# Patient Record
Sex: FEMALE | Race: WHITE | NOT HISPANIC OR LATINO
[De-identification: names, ages, dates, MRNs, and addresses within clinical notes are randomized per-mention and may not be internally consistent; named-entity substitution may affect disease eponyms.]

---

## 2017-12-18 PROBLEM — Z00.00 ENCOUNTER FOR PREVENTIVE HEALTH EXAMINATION: Status: ACTIVE | Noted: 2017-12-18

## 2018-01-16 ENCOUNTER — APPOINTMENT (OUTPATIENT)
Dept: OBGYN | Facility: CLINIC | Age: 28
End: 2018-01-16
Payer: COMMERCIAL

## 2018-01-16 VITALS
BODY MASS INDEX: 30.99 KG/M2 | SYSTOLIC BLOOD PRESSURE: 110 MMHG | WEIGHT: 168.4 LBS | DIASTOLIC BLOOD PRESSURE: 76 MMHG | HEIGHT: 62 IN

## 2018-01-16 DIAGNOSIS — N92.6 IRREGULAR MENSTRUATION, UNSPECIFIED: ICD-10-CM

## 2018-01-16 DIAGNOSIS — N94.6 DYSMENORRHEA, UNSPECIFIED: ICD-10-CM

## 2018-01-16 DIAGNOSIS — F15.90 OTHER STIMULANT USE, UNSPECIFIED, UNCOMPLICATED: ICD-10-CM

## 2018-01-16 DIAGNOSIS — Z82.49 FAMILY HISTORY OF ISCHEMIC HEART DISEASE AND OTHER DISEASES OF THE CIRCULATORY SYSTEM: ICD-10-CM

## 2018-01-16 DIAGNOSIS — F32.81 PREMENSTRUAL DYSPHORIC DISORDER: ICD-10-CM

## 2018-01-16 DIAGNOSIS — Z83.3 FAMILY HISTORY OF DIABETES MELLITUS: ICD-10-CM

## 2018-01-16 DIAGNOSIS — Z86.59 PERSONAL HISTORY OF OTHER MENTAL AND BEHAVIORAL DISORDERS: ICD-10-CM

## 2018-01-16 DIAGNOSIS — F19.90 OTHER PSYCHOACTIVE SUBSTANCE USE, UNSPECIFIED, UNCOMPLICATED: ICD-10-CM

## 2018-01-16 DIAGNOSIS — Z78.9 OTHER SPECIFIED HEALTH STATUS: ICD-10-CM

## 2018-01-16 DIAGNOSIS — Z13.79 ENCOUNTER FOR OTHER SCREENING FOR GENETIC AND CHROMOSOMAL ANOMALIES: ICD-10-CM

## 2018-01-16 DIAGNOSIS — N39.0 URINARY TRACT INFECTION, SITE NOT SPECIFIED: ICD-10-CM

## 2018-01-16 LAB
HCG UR QL: NEGATIVE
QUALITY CONTROL: YES

## 2018-01-16 PROCEDURE — 99385 PREV VISIT NEW AGE 18-39: CPT

## 2018-01-16 PROCEDURE — 81025 URINE PREGNANCY TEST: CPT

## 2018-01-16 RX ORDER — DULOXETINE HYDROCHLORIDE 60 MG/1
60 CAPSULE, DELAYED RELEASE ORAL
Refills: 0 | Status: ACTIVE | COMMUNITY

## 2018-01-19 LAB
APPEARANCE: ABNORMAL
BACTERIA UR CULT: NORMAL
BACTERIA: NEGATIVE
BILIRUBIN URINE: NEGATIVE
BLOOD URINE: NEGATIVE
C TRACH RRNA SPEC QL NAA+PROBE: NOT DETECTED
COLOR: YELLOW
GLUCOSE QUALITATIVE U: NEGATIVE MG/DL
HYALINE CASTS: 2 /LPF
KETONES URINE: NEGATIVE
LEUKOCYTE ESTERASE URINE: NEGATIVE
MICROSCOPIC-UA: NORMAL
N GONORRHOEA RRNA SPEC QL NAA+PROBE: NOT DETECTED
NITRITE URINE: NEGATIVE
PH URINE: 5.5
PROTEIN URINE: NEGATIVE MG/DL
RED BLOOD CELLS URINE: 2 /HPF
SOURCE TP AMPLIFICATION: NORMAL
SPECIFIC GRAVITY URINE: 1.02
SQUAMOUS EPITHELIAL CELLS: 1 /HPF
UROBILINOGEN URINE: NEGATIVE MG/DL
WHITE BLOOD CELLS URINE: 1 /HPF

## 2018-01-26 LAB
CYTOLOGY CVX/VAG DOC THIN PREP: NORMAL
HPV HIGH+LOW RISK DNA PNL CVX: NOT DETECTED

## 2019-01-29 ENCOUNTER — OTHER (OUTPATIENT)
Age: 29
End: 2019-01-29

## 2019-01-29 ENCOUNTER — APPOINTMENT (OUTPATIENT)
Dept: OBGYN | Facility: CLINIC | Age: 29
End: 2019-01-29
Payer: COMMERCIAL

## 2019-01-29 VITALS
SYSTOLIC BLOOD PRESSURE: 100 MMHG | BODY MASS INDEX: 29.3 KG/M2 | DIASTOLIC BLOOD PRESSURE: 70 MMHG | HEIGHT: 62 IN | WEIGHT: 159.2 LBS

## 2019-01-29 DIAGNOSIS — N88.2 STRICTURE AND STENOSIS OF CERVIX UTERI: ICD-10-CM

## 2019-01-29 DIAGNOSIS — R10.2 PELVIC AND PERINEAL PAIN: ICD-10-CM

## 2019-01-29 LAB
HCG UR QL: NEGATIVE
QUALITY CONTROL: YES

## 2019-01-29 PROCEDURE — 99395 PREV VISIT EST AGE 18-39: CPT

## 2019-01-29 PROCEDURE — 81025 URINE PREGNANCY TEST: CPT

## 2019-01-29 NOTE — PHYSICAL EXAM
[Awake] : awake [Alert] : alert [Soft] : soft [Oriented x3] : oriented to person, place, and time [Normal] : uterus [Labia Majora] : labia major [Labia Minora] : labia minora [No Bleeding] : there was no active vaginal bleeding [IUD String] : had an IUD string protruding out [Normal Position] : in a normal position [Uterine Adnexae] : were not tender and not enlarged [Acute Distress] : no acute distress [LAD] : no lymphadenopathy [Thyroid Nodule] : no thyroid nodule [Goiter] : no goiter [Mass] : no breast mass [Nipple Discharge] : no nipple discharge [Axillary LAD] : no axillary lymphadenopathy [Tender] : non tender [Distended] : not distended [H/Smegaly] : no hepatosplenomegaly [Depressed Mood] : not depressed [Flat Affect] : affect not flat [Tenderness] : nontender [Enlarged ___ wks] : not enlarged [Mass ___ cm] : no uterine mass was palpated [Adnexa Tenderness] : were not tender [Ovarian Mass (___ Cm)] : there were no adnexal masses [de-identified] : breast exam: supine and upright     pendulous breasts [FreeTextEntry5] : no pelvic or uerine pain on exam

## 2019-02-05 LAB
APPEARANCE: CLEAR
BACTERIA UR CULT: NORMAL
BACTERIA: NEGATIVE
BILIRUBIN URINE: NEGATIVE
BLOOD URINE: NEGATIVE
C TRACH RRNA SPEC QL NAA+PROBE: NOT DETECTED
COLOR: YELLOW
CYTOLOGY CVX/VAG DOC THIN PREP: NORMAL
GLUCOSE QUALITATIVE U: NEGATIVE MG/DL
HPV HIGH+LOW RISK DNA PNL CVX: NOT DETECTED
KETONES URINE: NEGATIVE
LEUKOCYTE ESTERASE URINE: NEGATIVE
MICROSCOPIC-UA: NORMAL
N GONORRHOEA RRNA SPEC QL NAA+PROBE: NOT DETECTED
NITRITE URINE: NEGATIVE
PH URINE: 7
PROTEIN URINE: NEGATIVE MG/DL
RED BLOOD CELLS URINE: 3 /HPF
SOURCE TP AMPLIFICATION: NORMAL
SPECIFIC GRAVITY URINE: 1.01
SQUAMOUS EPITHELIAL CELLS: 0 /HPF
UROBILINOGEN URINE: NEGATIVE MG/DL
WHITE BLOOD CELLS URINE: 0 /HPF

## 2019-10-14 ENCOUNTER — APPOINTMENT (OUTPATIENT)
Dept: OBGYN | Facility: CLINIC | Age: 29
End: 2019-10-14
Payer: COMMERCIAL

## 2019-10-14 VITALS — HEIGHT: 62 IN | DIASTOLIC BLOOD PRESSURE: 70 MMHG | SYSTOLIC BLOOD PRESSURE: 100 MMHG

## 2019-10-14 DIAGNOSIS — Z97.5 PRESENCE OF (INTRAUTERINE) CONTRACEPTIVE DEVICE: ICD-10-CM

## 2019-10-14 DIAGNOSIS — Z30.9 ENCOUNTER FOR CONTRACEPTIVE MANAGEMENT, UNSPECIFIED: ICD-10-CM

## 2019-10-14 DIAGNOSIS — Z30.433 ENCOUNTER FOR REMOVAL AND REINSERTION OF INTRAUTERINE CONTRACEPTIVE DEVICE: ICD-10-CM

## 2019-10-14 PROCEDURE — 58301 REMOVE INTRAUTERINE DEVICE: CPT

## 2019-10-14 PROCEDURE — 99214 OFFICE O/P EST MOD 30 MIN: CPT | Mod: 25

## 2019-10-14 PROCEDURE — 58300 INSERT INTRAUTERINE DEVICE: CPT | Mod: 52

## 2019-10-14 PROCEDURE — 81025 URINE PREGNANCY TEST: CPT

## 2019-10-15 LAB
HCG UR QL: NEGATIVE
QUALITY CONTROL: YES

## 2019-10-22 LAB — ACTINOMYCES CULTURE FOR IUD: NORMAL

## 2020-02-18 ENCOUNTER — APPOINTMENT (OUTPATIENT)
Dept: OBGYN | Facility: CLINIC | Age: 30
End: 2020-02-18
Payer: COMMERCIAL

## 2020-02-18 VITALS
SYSTOLIC BLOOD PRESSURE: 100 MMHG | HEIGHT: 62 IN | DIASTOLIC BLOOD PRESSURE: 70 MMHG | WEIGHT: 148 LBS | BODY MASS INDEX: 27.23 KG/M2

## 2020-02-18 LAB
HCG UR QL: NEGATIVE
QUALITY CONTROL: YES

## 2020-02-18 PROCEDURE — 81025 URINE PREGNANCY TEST: CPT

## 2020-02-18 PROCEDURE — 99395 PREV VISIT EST AGE 18-39: CPT

## 2020-02-18 NOTE — PHYSICAL EXAM
[Awake] : awake [Alert] : alert [Soft] : soft [Oriented x3] : oriented to person, place, and time [Normal] : uterus [No Bleeding] : there was no active vaginal bleeding [Labia Minora] : labia minora [Labia Majora] : labia major [Normal Position] : in a normal position [Uterine Adnexae] : were not tender and not enlarged [Acute Distress] : no acute distress [LAD] : no lymphadenopathy [Thyroid Nodule] : no thyroid nodule [Goiter] : no goiter [Mass] : no breast mass [Tender] : non tender [Nipple Discharge] : no nipple discharge [Axillary LAD] : no axillary lymphadenopathy [H/Smegaly] : no hepatosplenomegaly [Distended] : not distended [Depressed Mood] : not depressed [IUD String] : did not have an IUD string protruding out [Flat Affect] : affect not flat [Tenderness] : nontender [Enlarged ___ wks] : not enlarged [Mass ___ cm] : no uterine mass was palpated [Adnexa Tenderness] : were not tender [Ovarian Mass (___ Cm)] : there were no adnexal masses [de-identified] : breast exam: supine and upright     pendulous breasts

## 2020-03-03 ENCOUNTER — TRANSCRIPTION ENCOUNTER (OUTPATIENT)
Age: 30
End: 2020-03-03

## 2020-03-04 LAB
C TRACH RRNA SPEC QL NAA+PROBE: NOT DETECTED
CYTOLOGY CVX/VAG DOC THIN PREP: NORMAL
HPV HIGH+LOW RISK DNA PNL CVX: NOT DETECTED
N GONORRHOEA RRNA SPEC QL NAA+PROBE: NOT DETECTED
SOURCE TP AMPLIFICATION: NORMAL

## 2020-09-17 ENCOUNTER — APPOINTMENT (OUTPATIENT)
Dept: OBGYN | Facility: CLINIC | Age: 30
End: 2020-09-17

## 2020-12-16 PROBLEM — N39.0 ACUTE UTI: Status: RESOLVED | Noted: 2018-01-16 | Resolved: 2020-12-16

## 2021-11-27 ENCOUNTER — NON-APPOINTMENT (OUTPATIENT)
Age: 31
End: 2021-11-27

## 2021-12-02 ENCOUNTER — NON-APPOINTMENT (OUTPATIENT)
Age: 31
End: 2021-12-02

## 2021-12-02 ENCOUNTER — APPOINTMENT (OUTPATIENT)
Dept: OBGYN | Facility: CLINIC | Age: 31
End: 2021-12-02
Payer: MEDICAID

## 2021-12-02 VITALS
BODY MASS INDEX: 29.44 KG/M2 | RESPIRATION RATE: 16 BRPM | HEIGHT: 62 IN | SYSTOLIC BLOOD PRESSURE: 102 MMHG | TEMPERATURE: 96.7 F | DIASTOLIC BLOOD PRESSURE: 68 MMHG | WEIGHT: 160 LBS

## 2021-12-02 DIAGNOSIS — N94.3 PREMENSTRUAL TENSION SYNDROME: ICD-10-CM

## 2021-12-02 DIAGNOSIS — Z01.419 ENCOUNTER FOR GYNECOLOGICAL EXAMINATION (GENERAL) (ROUTINE) W/OUT ABNORMAL FINDINGS: ICD-10-CM

## 2021-12-02 PROCEDURE — 99395 PREV VISIT EST AGE 18-39: CPT

## 2021-12-02 RX ORDER — ALPRAZOLAM 0.5 MG/1
0.5 TABLET, EXTENDED RELEASE ORAL
Qty: 1 | Refills: 0 | Status: DISCONTINUED | COMMUNITY
Start: 2019-01-29 | End: 2021-12-02

## 2021-12-02 RX ORDER — NORETHINDRONE ACETATE AND ETHINYL ESTRADIOL, ETHINYL ESTRADIOL AND FERROUS FUMARATE 1MG-10(24)
1 MG-10 MCG / KIT ORAL DAILY
Qty: 84 | Refills: 1 | Status: DISCONTINUED | COMMUNITY
Start: 2020-02-18 | End: 2021-12-02

## 2021-12-02 RX ORDER — MISOPROSTOL 100 UG/1
100 TABLET ORAL
Qty: 2 | Refills: 0 | Status: DISCONTINUED | COMMUNITY
Start: 2019-01-29 | End: 2021-12-02

## 2021-12-02 RX ORDER — NORETHINDRONE ACETATE AND ETHINYL ESTRADIOL, ETHINYL ESTRADIOL AND FERROUS FUMARATE 1MG-10(24)
1 MG-10 MCG / KIT ORAL DAILY
Qty: 84 | Refills: 1 | Status: DISCONTINUED | COMMUNITY
Start: 2019-10-14 | End: 2021-12-02

## 2021-12-02 RX ORDER — LORAZEPAM 2 MG/1
TABLET ORAL
Refills: 0 | Status: DISCONTINUED | COMMUNITY
End: 2021-12-02

## 2021-12-02 NOTE — DISCUSSION/SUMMARY
[FreeTextEntry1] : 30 yo here for well woman exam. \par 1) Health maintenance: \par Pap + HPV\par GC/CT\par Declines additional STI testing\par Interested in Gardasil, handout provided. Pt will return for initiation of series. \par \par 2) Pregnancy planning:\par Preconception planning discussed\par Start PNV/folic acid\par Timed intercourse discussed\par Follow up pregnancy desire with psychiatrist to review medications\par \par 3) PMDD: \par Currently well-controlled on current regimen\par Continue to monitor symptoms

## 2021-12-02 NOTE — PHYSICAL EXAM
[Chaperone Present] : A chaperone was present in the examining room during all aspects of the physical examination [FreeTextEntry1] : NOE Edward  [Appropriately responsive] : appropriately responsive [Alert] : alert [No Acute Distress] : no acute distress [No Lymphadenopathy] : no lymphadenopathy [Soft] : soft [Non-tender] : non-tender [Non-distended] : non-distended [No HSM] : No HSM [No Lesions] : no lesions [No Mass] : no mass [Oriented x3] : oriented x3 [Examination Of The Breasts] : a normal appearance [No Discharge] : no discharge [No Masses] : no breast masses were palpable [Labia Majora] : normal [Labia Minora] : normal [Normal] : normal [Tenderness] : nontender [Enlarged ___ wks] : not enlarged [Uterine Adnexae] : normal

## 2021-12-02 NOTE — HISTORY OF PRESENT ILLNESS
[Patient reported PAP Smear was normal] : Patient reported PAP Smear was normal [Y] : Patient is sexually active [Monogamous (Male Partner)] : is monogamous with a male partner [N] : Patient denies prior pregnancies [PapSmeardate] : 2/2020 [LMPDate] : 11/12/21 [MensesFreq] : 28 [MensesLength] : 4 [MensesAmount] : normal [de-identified] : x 9 yrs [FreeTextEntry1] : Denies cysts, fibroids, abnormal pap, STI. Desires Gardasil vaccine.
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
## 2021-12-03 LAB
C TRACH RRNA SPEC QL NAA+PROBE: NOT DETECTED
HPV HIGH+LOW RISK DNA PNL CVX: NOT DETECTED
N GONORRHOEA RRNA SPEC QL NAA+PROBE: NOT DETECTED
SOURCE TP AMPLIFICATION: NORMAL

## 2021-12-08 LAB — CYTOLOGY CVX/VAG DOC THIN PREP: NORMAL

## 2022-11-01 ENCOUNTER — NON-APPOINTMENT (OUTPATIENT)
Age: 32
End: 2022-11-01

## 2022-11-08 ENCOUNTER — APPOINTMENT (OUTPATIENT)
Dept: OBGYN | Facility: CLINIC | Age: 32
End: 2022-11-08
Payer: MEDICAID

## 2022-11-08 VITALS
WEIGHT: 155 LBS | BODY MASS INDEX: 28.52 KG/M2 | SYSTOLIC BLOOD PRESSURE: 122 MMHG | DIASTOLIC BLOOD PRESSURE: 60 MMHG | HEIGHT: 62 IN

## 2022-11-08 LAB
HCG UR QL: POSITIVE
QUALITY CONTROL: YES

## 2022-11-08 PROCEDURE — 99213 OFFICE O/P EST LOW 20 MIN: CPT | Mod: TH

## 2022-11-08 PROCEDURE — 81025 URINE PREGNANCY TEST: CPT

## 2022-11-08 RX ORDER — GABAPENTIN 250 MG/5ML
300 SOLUTION ORAL
Refills: 0 | Status: DISCONTINUED | COMMUNITY
End: 2022-11-08

## 2022-11-08 RX ORDER — QUETIAPINE 400 MG/1
TABLET, FILM COATED ORAL
Refills: 0 | Status: DISCONTINUED | COMMUNITY
End: 2022-11-08

## 2022-11-10 ENCOUNTER — NON-APPOINTMENT (OUTPATIENT)
Age: 32
End: 2022-11-10

## 2022-11-11 DIAGNOSIS — O20.0 THREATENED ABORTION: ICD-10-CM

## 2022-11-14 ENCOUNTER — LABORATORY RESULT (OUTPATIENT)
Age: 32
End: 2022-11-14

## 2022-11-14 ENCOUNTER — APPOINTMENT (OUTPATIENT)
Dept: OBGYN | Facility: CLINIC | Age: 32
End: 2022-11-14

## 2022-11-14 VITALS
DIASTOLIC BLOOD PRESSURE: 72 MMHG | HEIGHT: 62 IN | SYSTOLIC BLOOD PRESSURE: 120 MMHG | BODY MASS INDEX: 28.42 KG/M2 | WEIGHT: 154.44 LBS

## 2022-11-14 DIAGNOSIS — K05.6 PERIODONTAL DISEASE, UNSPECIFIED: ICD-10-CM

## 2022-11-14 DIAGNOSIS — Z97.5 PRESENCE OF (INTRAUTERINE) CONTRACEPTIVE DEVICE: ICD-10-CM

## 2022-11-14 PROCEDURE — 99213 OFFICE O/P EST LOW 20 MIN: CPT | Mod: TH

## 2022-11-14 NOTE — PHYSICAL EXAM
[No Lesions] : no genitalia lesions [Labia Majora] : labia major [Labia Minora] : labia minora [Normal] : clitoris [No Bleeding] : there was no active vaginal bleeding [Dilated] : the cervix was not dilated [Motion Tenderness] : there was no cervical motion tenderness [Tenderness] : nontender [Enlarged ___ wks] : not enlarged [Uterine Adnexae] : were not tender and not enlarged [Adnexa Tenderness] : were not tender [Ovarian Mass (___ Cm)] : there were no adnexal masses [FreeTextEntry5] : friable

## 2022-11-14 NOTE — HISTORY OF PRESENT ILLNESS
[Definite:  ___ (Date)] : the last menstrual period was [unfilled] [Regular Cycle Intervals] : periods have been regular [HCG+: ___(Date)] : a positive HCG was recorded on [unfilled] [Sexually Active] : is sexually active [Monogamous] : is monogamous [Male ___] : [unfilled] male [On BCP at conception] : the patient was not on BCP at conception

## 2022-11-14 NOTE — CHIEF COMPLAINT
[Urgent Visit] : Urgent Visit [FreeTextEntry1] : LMP 9/30/22, EGA 6w3d today, RICARDO 7/7/23\par Pt denies abd pain, reports some cramping towards vagina. Reports brown discharge and red blood when she wipes, none on pad\par \par A pos blood type\par \par VB on 11/10/22.\par Pt was seen in ER, beta hcg of 1588\par TVUS showed "cystic sac" no fetal pole or yolk sac.\par \par Repeat beta hcg , TSH reflex and T3 today

## 2022-11-14 NOTE — REVIEW OF SYSTEMS
[Abn Vag Bleeding] : abnormal vaginal bleeding [Abdominal Pain] : no abdominal pain [Pelvic Pain] : no pelvic pain

## 2022-11-15 LAB
T3 SERPL-MCNC: 101 NG/DL
TSH SERPL-ACNC: 2.77 UIU/ML

## 2022-11-16 ENCOUNTER — APPOINTMENT (OUTPATIENT)
Dept: PLASTIC SURGERY | Facility: CLINIC | Age: 32
End: 2022-11-16

## 2022-11-16 ENCOUNTER — APPOINTMENT (OUTPATIENT)
Dept: ANTEPARTUM | Facility: CLINIC | Age: 32
End: 2022-11-16

## 2022-11-16 ENCOUNTER — ASOB RESULT (OUTPATIENT)
Age: 32
End: 2022-11-16

## 2022-11-16 ENCOUNTER — NON-APPOINTMENT (OUTPATIENT)
Age: 32
End: 2022-11-16

## 2022-11-16 ENCOUNTER — APPOINTMENT (OUTPATIENT)
Dept: OBGYN | Facility: CLINIC | Age: 32
End: 2022-11-16

## 2022-11-16 VITALS
BODY MASS INDEX: 28.52 KG/M2 | WEIGHT: 155 LBS | HEIGHT: 62 IN | SYSTOLIC BLOOD PRESSURE: 122 MMHG | DIASTOLIC BLOOD PRESSURE: 68 MMHG

## 2022-11-16 LAB
ABO + RH PNL BLD: NORMAL
AR GENE MUT ANL BLD/T: NORMAL
B19V IGG SER QL IA: 7.49 INDEX
B19V IGG+IGM SER-IMP: NORMAL
B19V IGG+IGM SER-IMP: POSITIVE
B19V IGM FLD-ACNC: 0.11 INDEX
B19V IGM SER-ACNC: NEGATIVE
BASOPHILS # BLD AUTO: 0.03 K/UL
BASOPHILS NFR BLD AUTO: 0.3 %
BLD GP AB SCN SERPL QL: NORMAL
CFTR MUT TESTED BLD/T: NEGATIVE
CMV IGG SERPL QL: <0.2 U/ML
CMV IGG SERPL-IMP: NEGATIVE
CMV IGM SERPL QL: <8 AU/ML
CMV IGM SERPL QL: NEGATIVE
EOSINOPHIL # BLD AUTO: 0.19 K/UL
EOSINOPHIL NFR BLD AUTO: 1.6 %
ESTIMATED AVERAGE GLUCOSE: 103 MG/DL
FMR1 GENE MUT ANL BLD/T: NORMAL
HBA1C MFR BLD HPLC: 5.2 %
HBV SURFACE AG SER QL: NONREACTIVE
HCT VFR BLD CALC: 36.3 %
HGB A MFR BLD: 96.4 %
HGB A2 MFR BLD: 2.7 %
HGB BLD-MCNC: 12.8 G/DL
HGB F MFR BLD: 0.9 %
HGB FRACT BLD-IMP: NORMAL
HIV1+2 AB SPEC QL IA.RAPID: NONREACTIVE
IMM GRANULOCYTES NFR BLD AUTO: 0.4 %
LYMPHOCYTES # BLD AUTO: 4.4 K/UL
LYMPHOCYTES NFR BLD AUTO: 38 %
MAN DIFF?: NORMAL
MCHC RBC-ENTMCNC: 30.2 PG
MCHC RBC-ENTMCNC: 35.3 GM/DL
MCV RBC AUTO: 85.6 FL
MEV IGG FLD QL IA: >300 AU/ML
MEV IGG+IGM SER-IMP: POSITIVE
MONOCYTES # BLD AUTO: 0.69 K/UL
MONOCYTES NFR BLD AUTO: 6 %
NEUTROPHILS # BLD AUTO: 6.23 K/UL
NEUTROPHILS NFR BLD AUTO: 53.7 %
PLATELET # BLD AUTO: 313 K/UL
RBC # BLD: 4.24 M/UL
RBC # FLD: 11.9 %
RUBV IGG FLD-ACNC: 5.6 INDEX
RUBV IGG SER-IMP: POSITIVE
T GONDII AB SER-IMP: NEGATIVE
T GONDII IGM SER QL: 4.4 AU/ML
T PALLIDUM AB SER QL IA: NEGATIVE
TSH SERPL-ACNC: 9.76 UIU/ML
WBC # FLD AUTO: 11.59 K/UL

## 2022-11-16 PROCEDURE — 76801 OB US < 14 WKS SINGLE FETUS: CPT

## 2022-11-16 PROCEDURE — 99213 OFFICE O/P EST LOW 20 MIN: CPT | Mod: TH

## 2022-11-16 PROCEDURE — 76817 TRANSVAGINAL US OBSTETRIC: CPT | Mod: 59

## 2022-11-16 RX ORDER — METHYLPHENIDATE HYDROCHLORIDE 18 MG/1
18 TABLET, EXTENDED RELEASE ORAL
Qty: 30 | Refills: 0 | Status: DISCONTINUED | COMMUNITY
Start: 2022-06-29

## 2022-11-16 RX ORDER — QUETIAPINE FUMARATE 50 MG/1
50 TABLET ORAL
Qty: 30 | Refills: 0 | Status: DISCONTINUED | COMMUNITY
Start: 2022-05-20

## 2022-11-16 RX ORDER — HYDROXYZINE PAMOATE 100 MG/1
100 CAPSULE ORAL
Qty: 60 | Refills: 0 | Status: DISCONTINUED | COMMUNITY
Start: 2022-05-21

## 2022-11-16 RX ORDER — GABAPENTIN 300 MG/1
300 CAPSULE ORAL
Qty: 90 | Refills: 0 | Status: DISCONTINUED | COMMUNITY
Start: 2022-04-11

## 2022-11-16 RX ORDER — FLUTICASONE PROPIONATE 50 UG/1
50 SPRAY, METERED NASAL
Qty: 16 | Refills: 0 | Status: DISCONTINUED | COMMUNITY
Start: 2022-07-28

## 2022-11-16 RX ORDER — QUETIAPINE FUMARATE 100 MG/1
100 TABLET ORAL
Qty: 45 | Refills: 0 | Status: DISCONTINUED | COMMUNITY
Start: 2022-06-29

## 2022-11-18 ENCOUNTER — NON-APPOINTMENT (OUTPATIENT)
Age: 32
End: 2022-11-18

## 2022-11-21 ENCOUNTER — APPOINTMENT (OUTPATIENT)
Dept: OBGYN | Facility: CLINIC | Age: 32
End: 2022-11-21

## 2022-11-21 VITALS
BODY MASS INDEX: 28.52 KG/M2 | SYSTOLIC BLOOD PRESSURE: 120 MMHG | DIASTOLIC BLOOD PRESSURE: 80 MMHG | HEIGHT: 62 IN | WEIGHT: 155 LBS

## 2022-11-21 DIAGNOSIS — O46.90 ANTEPARTUM HEMORRHAGE, UNSPECIFIED, UNSPECIFIED TRIMESTER: ICD-10-CM

## 2022-11-21 LAB
BACTERIA UR CULT: NORMAL
C TRACH RRNA SPEC QL NAA+PROBE: NOT DETECTED
N GONORRHOEA RRNA SPEC QL NAA+PROBE: NOT DETECTED
SOURCE AMPLIFICATION: NORMAL

## 2022-11-21 PROCEDURE — 99213 OFFICE O/P EST LOW 20 MIN: CPT | Mod: TH

## 2022-11-21 NOTE — PLAN
[FreeTextEntry1] : Beta hcg, CBC today\par Will call with results\par f/u in 48 hours for repeat beta prn\par warning s/s rev'd, when to go to ER\par Pt to wait 3 normal cycles before trying to conceive again.\par \par Shilpi Dumont CM

## 2022-11-21 NOTE — REVIEW OF SYSTEMS
[Abn Vaginal bleeding] : abnormal vaginal bleeding [Dysuria] : no dysuria [Pelvic pain] : no pelvic pain [FreeTextEntry8] : pink streaking when wipes

## 2022-11-21 NOTE — HISTORY OF PRESENT ILLNESS
[Patient reported PAP Smear was normal] : Patient reported PAP Smear was normal [Y] : Positive pregnancy history [PapSmeardate] : 2020 [LMPDate] : 9/30/22 [PGxTotal] : 1

## 2022-11-21 NOTE — HISTORY OF PRESENT ILLNESS
[Y] : Positive pregnancy history [FreeTextEntry1] : Pt here for f/u, was having heavy bleeding Friday, passing clots. Now reports vaginal bleeding light, 1 pad per day red and brown bleeding.\par \par Pt declines exam today\par \par Will do beta hcg, cbc today\par \par A pos blood type in record.\par \par Pt is upset about possible miscarriage.\par \par Advised bleeding can last for about 2 weeks. Nothing in vagina until bleeding stops.\par  [LMPDate] : 9/30/22 [PGxTotal] : 1

## 2022-11-21 NOTE — PLAN
[FreeTextEntry1] : Rev'd TVUS today, gestational sac seen, no fetal pole or yolk sac, no adnexal masses seen.\par Rev'd Beta HCG from 11/14/22 3950, up from 1588 on 11/10/22 at [facility].\par Pt reports scant pink streaking when she wipes, no staining on pad, denies abd pain.\par Warning s/s rev'd, if bright red blood, clots, abd pain then pt to call or go to ER.\par RTO for dating sono to confirm viability and new OB visit.\par GC/CT in urine, urine culture today\par Pt to continue PNV.

## 2022-11-22 LAB
BASOPHILS # BLD AUTO: 0.02 K/UL
BASOPHILS NFR BLD AUTO: 0.3 %
EOSINOPHIL # BLD AUTO: 0.15 K/UL
EOSINOPHIL NFR BLD AUTO: 2.1 %
HCG SERPL-MCNC: 9468 MIU/ML
HCT VFR BLD CALC: 37.3 %
HGB BLD-MCNC: 13 G/DL
IMM GRANULOCYTES NFR BLD AUTO: 0.4 %
LYMPHOCYTES # BLD AUTO: 2.52 K/UL
LYMPHOCYTES NFR BLD AUTO: 35.5 %
MAN DIFF?: NORMAL
MCHC RBC-ENTMCNC: 30.7 PG
MCHC RBC-ENTMCNC: 34.9 GM/DL
MCV RBC AUTO: 88.2 FL
MONOCYTES # BLD AUTO: 0.47 K/UL
MONOCYTES NFR BLD AUTO: 6.6 %
NEUTROPHILS # BLD AUTO: 3.91 K/UL
NEUTROPHILS NFR BLD AUTO: 55.1 %
PLATELET # BLD AUTO: 327 K/UL
RBC # BLD: 4.23 M/UL
RBC # FLD: 12 %
WBC # FLD AUTO: 7.1 K/UL

## 2022-11-23 ENCOUNTER — APPOINTMENT (OUTPATIENT)
Dept: OBGYN | Facility: CLINIC | Age: 32
End: 2022-11-23

## 2022-11-23 ENCOUNTER — NON-APPOINTMENT (OUTPATIENT)
Age: 32
End: 2022-11-23

## 2022-11-28 ENCOUNTER — NON-APPOINTMENT (OUTPATIENT)
Age: 32
End: 2022-11-28

## 2022-11-29 ENCOUNTER — APPOINTMENT (OUTPATIENT)
Dept: ANTEPARTUM | Facility: CLINIC | Age: 32
End: 2022-11-29

## 2022-11-29 ENCOUNTER — APPOINTMENT (OUTPATIENT)
Dept: OBGYN | Facility: CLINIC | Age: 32
End: 2022-11-29

## 2022-11-29 ENCOUNTER — ASOB RESULT (OUTPATIENT)
Age: 32
End: 2022-11-29

## 2022-11-29 VITALS
HEIGHT: 62 IN | DIASTOLIC BLOOD PRESSURE: 81 MMHG | BODY MASS INDEX: 27.6 KG/M2 | SYSTOLIC BLOOD PRESSURE: 118 MMHG | WEIGHT: 150 LBS

## 2022-11-29 PROCEDURE — 99212 OFFICE O/P EST SF 10 MIN: CPT | Mod: TH

## 2022-11-29 PROCEDURE — 76817 TRANSVAGINAL US OBSTETRIC: CPT

## 2022-12-01 ENCOUNTER — NON-APPOINTMENT (OUTPATIENT)
Age: 32
End: 2022-12-01

## 2022-12-01 LAB
HCG SERPL-MCNC: ABNORMAL MIU/ML
PROGEST SERPL-MCNC: 4.8 NG/ML

## 2022-12-02 ENCOUNTER — NON-APPOINTMENT (OUTPATIENT)
Age: 32
End: 2022-12-02

## 2022-12-06 ENCOUNTER — APPOINTMENT (OUTPATIENT)
Dept: ANTEPARTUM | Facility: CLINIC | Age: 32
End: 2022-12-06

## 2022-12-06 ENCOUNTER — APPOINTMENT (OUTPATIENT)
Dept: OBGYN | Facility: CLINIC | Age: 32
End: 2022-12-06

## 2022-12-06 ENCOUNTER — ASOB RESULT (OUTPATIENT)
Age: 32
End: 2022-12-06

## 2022-12-06 VITALS
SYSTOLIC BLOOD PRESSURE: 120 MMHG | WEIGHT: 151.44 LBS | DIASTOLIC BLOOD PRESSURE: 80 MMHG | HEIGHT: 62 IN | BODY MASS INDEX: 27.87 KG/M2

## 2022-12-06 PROCEDURE — 99213 OFFICE O/P EST LOW 20 MIN: CPT | Mod: TH

## 2022-12-06 PROCEDURE — 76817 TRANSVAGINAL US OBSTETRIC: CPT

## 2022-12-06 RX ORDER — AZELASTINE HYDROCHLORIDE 137 UG/1
137 SPRAY, METERED NASAL
Qty: 30 | Refills: 0 | Status: DISCONTINUED | COMMUNITY
Start: 2022-07-28 | End: 2022-12-06

## 2022-12-06 NOTE — DISCUSSION/SUMMARY
[FreeTextEntry1] : -Pelvic sonogram today: Small gestational sac is again visualized. No evidence of a fetal pole or yolk sac at this time. The gestational sac grew slightly measuring 13.14 x 9.81 x 9.23 cm. Results were discussed.\par \par -Options for evacuation of miscarriage were reviewed including medical evacuation and surgical (D&C under sedation) were discussed. Procedure details, risks, and benefits of each option were discussed. All questions and concerns were discussed. Complete preoperative and postoperative instructions were given. Patient desires genetic testing for POC tissue. Task sent to surgical scheduler. \par \par -She will schedule procedure and follow up post operatively. \par \par All questions and concerns were discussed.\par

## 2022-12-06 NOTE — HISTORY OF PRESENT ILLNESS
[HIV Test offered] : HIV Test offered [Gonorrhea test offered] : Gonorrhea test offered [Chlamydia test offered] : Chlamydia test offered [HPV test offered] : HPV test offered [N] : Patient does not use contraception [Y] : Positive pregnancy history [Menarche Age: ____] : age at menarche was [unfilled] [Currently Active] : currently active [Men] : men [PapSmeardate] : 12/2/21 [TextBox_31] : neg [HIVDate] : 11/8/22 [TextBox_53] : neg [GonorrheaDate] : 11/16/22 [TextBox_63] : neg [ChlamydiaDate] : 11/16/22 [TextBox_68] : neg [HPVDate] : 12/2/21 [TextBox_78] : neg [LMPDate] : 9/30/22 [PGxTotal] : 1 [FreeTextEntry1] : 9/30/22

## 2022-12-06 NOTE — END OF VISIT
[FreeTextEntry3] : I, Evette Flores solely acted as a scribe for Dr. Jackson Bazan on 12/06/2022 . All medical entries made by the scribe were at my, Dr. Bazan's, direction and personally dictated by me on 12/06/2022 . I have reviewed the chart and agree that the record accurately reflects my personal performance of the history, physical exam, assessment and plan. I have also personally directed, reviewed, and agreed with the chart.

## 2022-12-06 NOTE — PHYSICAL EXAM
[Chaperone Present] : A chaperone was present in the examining room during all aspects of the physical examination [Appropriately responsive] : appropriately responsive [Alert] : alert [No Acute Distress] : no acute distress [Oriented x3] : oriented x3 [FreeTextEntry1] : ERASTO Armijo

## 2022-12-09 ENCOUNTER — APPOINTMENT (OUTPATIENT)
Dept: OBGYN | Facility: HOSPITAL | Age: 32
End: 2022-12-09

## 2022-12-09 ENCOUNTER — NON-APPOINTMENT (OUTPATIENT)
Age: 32
End: 2022-12-09

## 2022-12-10 ENCOUNTER — NON-APPOINTMENT (OUTPATIENT)
Age: 32
End: 2022-12-10

## 2022-12-12 ENCOUNTER — NON-APPOINTMENT (OUTPATIENT)
Age: 32
End: 2022-12-12

## 2022-12-22 ENCOUNTER — APPOINTMENT (OUTPATIENT)
Dept: OBGYN | Facility: CLINIC | Age: 32
End: 2022-12-22
Payer: MEDICAID

## 2022-12-22 VITALS
DIASTOLIC BLOOD PRESSURE: 90 MMHG | HEIGHT: 62 IN | BODY MASS INDEX: 28.52 KG/M2 | WEIGHT: 155 LBS | SYSTOLIC BLOOD PRESSURE: 131 MMHG

## 2022-12-22 LAB
BILIRUB UR QL STRIP: NORMAL
CLARITY UR: CLEAR
COLLECTION METHOD: NORMAL
GLUCOSE UR-MCNC: NORMAL
HCG UR QL: 0.2 EU/DL
HGB UR QL STRIP.AUTO: NORMAL
KETONES UR-MCNC: NORMAL
LEUKOCYTE ESTERASE UR QL STRIP: NORMAL
NITRITE UR QL STRIP: NORMAL
PH UR STRIP: 5.5
PROT UR STRIP-MCNC: NORMAL
SP GR UR STRIP: 1.03

## 2022-12-22 PROCEDURE — 99024 POSTOP FOLLOW-UP VISIT: CPT | Mod: 24

## 2022-12-22 PROCEDURE — 99213 OFFICE O/P EST LOW 20 MIN: CPT | Mod: 24

## 2022-12-22 NOTE — HISTORY OF PRESENT ILLNESS
[Fever] : no fever [Vaginal Bleeding] : no vaginal bleeding [Vaginal Discharge] : no vaginal discharge [None] : no vaginal bleeding [Pathology reviewed] : pathology reviewed [de-identified] : Dimple is here for a follow-up exam after her recent D&C for a miscarriage.  I informed her that the general pathology is normal but we are still awaiting the genetic studies.  She is feeling well she is not bleeding and she has noticed that all the symptoms of pregnancy have abated.  She is interested in getting pregnant again soon in the near future. She is due for a pap test and we will complete it today.

## 2022-12-22 NOTE — ASSESSMENT
[Doing Well] : is doing well [No Sign of Infection] : is showing no signs of infection [de-identified] : We discussed the issues associated with karyotype testing and the expectation that the tissue we submitted will grow to be analyzed.  There is a small chance that that will not happen.  She is interested in proceeding with another pregnancy in the near future I asked her to allow 1 or 2 normal periods to come and go.  All questions were answered and support was given.

## 2023-01-09 ENCOUNTER — APPOINTMENT (OUTPATIENT)
Dept: MATERNAL FETAL MEDICINE | Facility: CLINIC | Age: 33
End: 2023-01-09

## 2023-01-09 ENCOUNTER — NON-APPOINTMENT (OUTPATIENT)
Age: 33
End: 2023-01-09

## 2023-01-10 LAB
C TRACH RRNA SPEC QL NAA+PROBE: NOT DETECTED
CYTOLOGY CVX/VAG DOC THIN PREP: NORMAL
HCG SERPL-MCNC: 17 MIU/ML
HPV HIGH+LOW RISK DNA PNL CVX: NOT DETECTED
N GONORRHOEA RRNA SPEC QL NAA+PROBE: NOT DETECTED
SOURCE TP AMPLIFICATION: NORMAL

## 2023-01-11 ENCOUNTER — APPOINTMENT (OUTPATIENT)
Dept: OBGYN | Facility: CLINIC | Age: 33
End: 2023-01-11
Payer: MEDICAID

## 2023-01-11 ENCOUNTER — NON-APPOINTMENT (OUTPATIENT)
Age: 33
End: 2023-01-11

## 2023-01-11 VITALS
SYSTOLIC BLOOD PRESSURE: 130 MMHG | WEIGHT: 156 LBS | HEIGHT: 62 IN | DIASTOLIC BLOOD PRESSURE: 89 MMHG | BODY MASS INDEX: 28.71 KG/M2

## 2023-01-11 DIAGNOSIS — O02.1 MISSED ABORTION: ICD-10-CM

## 2023-01-11 PROCEDURE — XXXXX: CPT

## 2023-01-12 ENCOUNTER — NON-APPOINTMENT (OUTPATIENT)
Age: 33
End: 2023-01-12

## 2023-01-16 NOTE — PHYSICAL EXAM
[Chaperone Present] : A chaperone was present in the examining room during all aspects of the physical examination [FreeTextEntry1] : ERASTO Up [Appropriately responsive] : appropriately responsive [Alert] : alert [No Acute Distress] : no acute distress [No Lymphadenopathy] : no lymphadenopathy [Regular Rate Rhythm] : regular rate rhythm [Clear to Auscultation B/L] : clear to auscultation bilaterally [Soft] : soft [Non-tender] : non-tender [No Mass] : no mass [Oriented x3] : oriented x3 [Examination Of The Breasts] : a normal appearance [No Masses] : no breast masses were palpable [Labia Majora] : normal [Labia Minora] : normal [Normal] : normal [Uterine Adnexae] : normal

## 2023-01-16 NOTE — PLAN
[FreeTextEntry1] : -we discussed the nutritional needs and restrictions associated with pregnancy\par \par -we will draw all PNL's now and she will RTO for her first Ob visit. \par \par -all qestions were answered.

## 2023-01-16 NOTE — HISTORY OF PRESENT ILLNESS
[N] : Patient does not use contraception [Y] : Positive pregnancy history [Menarche Age: ____] : age at menarche was [unfilled] [Currently Active] : currently active [PapSmeardate] : 12/2/21 [TextBox_31] : neg [GonorrheaDate] : 12/2/21 [TextBox_63] : neg [ChlamydiaDate] : 12/2/21 [TextBox_68] : neg [HPVDate] : 12/2/21 [TextBox_78] : neg [LMPDate] : 9/30/22 [PGxTotal] : 1 [FreeTextEntry1] : 9/30/22

## 2023-02-26 LAB — HCG SERPL-MCNC: 2 MIU/ML

## 2023-03-07 ENCOUNTER — NON-APPOINTMENT (OUTPATIENT)
Age: 33
End: 2023-03-07

## 2023-03-13 ENCOUNTER — APPOINTMENT (OUTPATIENT)
Dept: OBGYN | Facility: CLINIC | Age: 33
End: 2023-03-13
Payer: MEDICAID

## 2023-03-20 ENCOUNTER — APPOINTMENT (OUTPATIENT)
Dept: OBGYN | Facility: CLINIC | Age: 33
End: 2023-03-20

## 2023-03-27 ENCOUNTER — APPOINTMENT (OUTPATIENT)
Dept: OBGYN | Facility: CLINIC | Age: 33
End: 2023-03-27
Payer: MEDICAID

## 2023-03-27 VITALS
BODY MASS INDEX: 29.71 KG/M2 | WEIGHT: 161.44 LBS | HEIGHT: 62 IN | SYSTOLIC BLOOD PRESSURE: 136 MMHG | DIASTOLIC BLOOD PRESSURE: 88 MMHG

## 2023-03-27 PROCEDURE — 99213 OFFICE O/P EST LOW 20 MIN: CPT | Mod: TH

## 2023-03-27 NOTE — DISCUSSION/SUMMARY
[FreeTextEntry1] : -Cervix tilted slightly to the right, should resolve with pregnancy. \par -ICON positive \par -HCG and Progesterone blood work drawn today\par -Patient will return in 3 weeks for OB visit.

## 2023-03-27 NOTE — PHYSICAL EXAM
[Chaperone Present] : A chaperone was present in the examining room during all aspects of the physical examination [Appropriately responsive] : appropriately responsive [Alert] : alert [No Acute Distress] : no acute distress [Soft] : soft [Non-tender] : non-tender [Non-distended] : non-distended [No HSM] : No HSM [No Lesions] : no lesions [No Mass] : no mass [Oriented x3] : oriented x3 [Labia Majora] : normal [Labia Minora] : normal [Normal] : normal [Uterine Adnexae] : normal [FreeTextEntry1] : Brissa Banks, [facility]

## 2023-03-27 NOTE — END OF VISIT
[FreeTextEntry3] : I, Brissa Darren solely acted as a scribe for Dr. Jackson Bazan on 03/27/2023  All medical entries made by the scribe were at my, Dr. Bazan's, direction and personally dictated by me on 03/27/2023. I have reviewed the chart and agree that the record accurately reflects my personal performance of the history, physical exam, assessment and plan. I have also personally directed, reviewed, and agreed with the chart.

## 2023-03-27 NOTE — HISTORY OF PRESENT ILLNESS
Patient in room CICU 2012. I have received report from  ANIKA Rivas  and had the opportunity 
to ask questions and assume patient care. [FreeTextEntry1] : Dimple is here today to confirm pregnancy, she is doing well and mentions her cervix being off centered. She also shares concerns for her pregnancy s/p D+C in December.  [PapSmeardate] : 12/22/22 [GonorrheaDate] : 12/22/22 [ChlamydiaDate] : 12/22/22 [HPVDate] : 12/22/22 [LMPDate] : 2/23/23

## 2023-03-30 ENCOUNTER — NON-APPOINTMENT (OUTPATIENT)
Age: 33
End: 2023-03-30

## 2023-04-25 ENCOUNTER — APPOINTMENT (OUTPATIENT)
Dept: ANTEPARTUM | Facility: CLINIC | Age: 33
End: 2023-04-25
Payer: MEDICAID

## 2023-04-25 ENCOUNTER — APPOINTMENT (OUTPATIENT)
Dept: OBGYN | Facility: CLINIC | Age: 33
End: 2023-04-25
Payer: MEDICAID

## 2023-04-25 ENCOUNTER — ASOB RESULT (OUTPATIENT)
Age: 33
End: 2023-04-25

## 2023-04-25 VITALS
WEIGHT: 164 LBS | DIASTOLIC BLOOD PRESSURE: 85 MMHG | HEIGHT: 62 IN | BODY MASS INDEX: 30.18 KG/M2 | SYSTOLIC BLOOD PRESSURE: 120 MMHG

## 2023-04-25 LAB
BILIRUB UR QL STRIP: NORMAL
GLUCOSE UR-MCNC: NORMAL
HCG SERPL-MCNC: 1397 MIU/ML
HCG UR QL: 0.2 EU/DL
HCG UR QL: POSITIVE
HCG UR QL: POSITIVE
HGB UR QL STRIP.AUTO: NORMAL
KETONES UR-MCNC: NORMAL
LEUKOCYTE ESTERASE UR QL STRIP: ABNORMAL
NITRITE UR QL STRIP: NORMAL
PH UR STRIP: 6
PROGEST SERPL-MCNC: 34.5 NG/ML
PROT UR STRIP-MCNC: NORMAL
QUALITY CONTROL: YES
QUALITY CONTROL: YES
SP GR UR STRIP: 1.02

## 2023-04-25 PROCEDURE — 99214 OFFICE O/P EST MOD 30 MIN: CPT | Mod: TH

## 2023-04-25 PROCEDURE — 76817 TRANSVAGINAL US OBSTETRIC: CPT

## 2023-04-25 PROCEDURE — 81003 URINALYSIS AUTO W/O SCOPE: CPT | Mod: QW

## 2023-04-26 ENCOUNTER — NON-APPOINTMENT (OUTPATIENT)
Age: 33
End: 2023-04-26

## 2023-05-05 ENCOUNTER — ASOB RESULT (OUTPATIENT)
Age: 33
End: 2023-05-05

## 2023-05-05 ENCOUNTER — APPOINTMENT (OUTPATIENT)
Dept: MATERNAL FETAL MEDICINE | Facility: CLINIC | Age: 33
End: 2023-05-05
Payer: MEDICAID

## 2023-05-05 PROCEDURE — 99202 OFFICE O/P NEW SF 15 MIN: CPT | Mod: TH,95

## 2023-05-17 ENCOUNTER — APPOINTMENT (OUTPATIENT)
Dept: ANTEPARTUM | Facility: CLINIC | Age: 33
End: 2023-05-17
Payer: MEDICAID

## 2023-05-17 ENCOUNTER — ASOB RESULT (OUTPATIENT)
Age: 33
End: 2023-05-17

## 2023-05-17 ENCOUNTER — APPOINTMENT (OUTPATIENT)
Dept: OBGYN | Facility: CLINIC | Age: 33
End: 2023-05-17
Payer: MEDICAID

## 2023-05-17 VITALS
HEIGHT: 62 IN | SYSTOLIC BLOOD PRESSURE: 124 MMHG | DIASTOLIC BLOOD PRESSURE: 86 MMHG | WEIGHT: 162.13 LBS | BODY MASS INDEX: 29.83 KG/M2

## 2023-05-17 DIAGNOSIS — Z34.90 ENCOUNTER FOR SUPERVISION OF NORMAL PREGNANCY, UNSPECIFIED, UNSPECIFIED TRIMESTER: ICD-10-CM

## 2023-05-17 PROCEDURE — 76801 OB US < 14 WKS SINGLE FETUS: CPT

## 2023-05-17 PROCEDURE — 99213 OFFICE O/P EST LOW 20 MIN: CPT | Mod: TH

## 2023-05-19 ENCOUNTER — NON-APPOINTMENT (OUTPATIENT)
Age: 33
End: 2023-05-19

## 2023-05-20 LAB
ABO + RH PNL BLD: NORMAL
B19V IGG SER QL IA: 6.72 INDEX
B19V IGG+IGM SER-IMP: NORMAL
B19V IGG+IGM SER-IMP: POSITIVE
B19V IGM FLD-ACNC: 0.16 INDEX
B19V IGM SER-ACNC: NEGATIVE
BASOPHILS # BLD AUTO: 0.04 K/UL
BASOPHILS NFR BLD AUTO: 0.3 %
BILIRUB UR QL STRIP: NORMAL
BLD GP AB SCN SERPL QL: NORMAL
CMV IGG SERPL QL: <0.2 U/ML
CMV IGG SERPL-IMP: NEGATIVE
CMV IGM SERPL QL: <8 AU/ML
CMV IGM SERPL QL: NEGATIVE
COLLECTION METHOD: NORMAL
EOSINOPHIL # BLD AUTO: 0.13 K/UL
EOSINOPHIL NFR BLD AUTO: 1.1 %
ESTIMATED AVERAGE GLUCOSE: 97 MG/DL
GLUCOSE UR-MCNC: NORMAL
HBA1C MFR BLD HPLC: 5 %
HBV SURFACE AG SER QL: NONREACTIVE
HCG UR QL: 0.2 EU/DL
HCT VFR BLD CALC: 38.9 %
HGB A MFR BLD: 96.2 %
HGB A2 MFR BLD: 2.7 %
HGB BLD-MCNC: 13.1 G/DL
HGB F MFR BLD: 1.1 %
HGB FRACT BLD-IMP: NORMAL
HGB UR QL STRIP.AUTO: NORMAL
HIV1+2 AB SPEC QL IA.RAPID: NONREACTIVE
IMM GRANULOCYTES NFR BLD AUTO: 0.7 %
KETONES UR-MCNC: NORMAL
LEUKOCYTE ESTERASE UR QL STRIP: ABNORMAL
LYMPHOCYTES # BLD AUTO: 3.25 K/UL
LYMPHOCYTES NFR BLD AUTO: 27.9 %
M TB IFN-G BLD-IMP: NEGATIVE
MAN DIFF?: NORMAL
MCHC RBC-ENTMCNC: 30.3 PG
MCHC RBC-ENTMCNC: 33.7 GM/DL
MCV RBC AUTO: 89.8 FL
MEV IGG FLD QL IA: >300 AU/ML
MEV IGG+IGM SER-IMP: POSITIVE
MONOCYTES # BLD AUTO: 0.61 K/UL
MONOCYTES NFR BLD AUTO: 5.2 %
NEUTROPHILS # BLD AUTO: 7.52 K/UL
NEUTROPHILS NFR BLD AUTO: 64.8 %
NITRITE UR QL STRIP: NORMAL
PH UR STRIP: 7
PLATELET # BLD AUTO: 326 K/UL
PROT UR STRIP-MCNC: NORMAL
QUANTIFERON TB PLUS MITOGEN MINUS NIL: 1.66 IU/ML
QUANTIFERON TB PLUS NIL: 0.18 IU/ML
QUANTIFERON TB PLUS TB1 MINUS NIL: 0.02 IU/ML
QUANTIFERON TB PLUS TB2 MINUS NIL: 0.05 IU/ML
RBC # BLD: 4.33 M/UL
RBC # FLD: 12 %
RUBV IGG FLD-ACNC: 6 INDEX
RUBV IGG SER-IMP: POSITIVE
SP GR UR STRIP: 1.01
T GONDII AB SER-IMP: NEGATIVE
T GONDII IGM SER QL: 6.2 AU/ML
T PALLIDUM AB SER QL IA: NEGATIVE
T3 SERPL-MCNC: 166 NG/DL
T3FREE SERPL-MCNC: 3.1 PG/ML
T4 FREE SERPL-MCNC: 1 NG/DL
T4 SERPL-MCNC: 9.4 UG/DL
TSH SERPL-ACNC: 2.35 UIU/ML
WBC # FLD AUTO: 11.63 K/UL

## 2023-05-20 NOTE — HISTORY OF PRESENT ILLNESS
[Menarche Age: ____] : age at menarche was [unfilled] [N] : Patient does not use contraception [Y] : Patient is sexually active [Currently Active] : currently active [PapSmeardate] : 12/22/22 [TextBox_31] : NEG [HIVDate] : 11/8/22 [TextBox_53] : NEG [GonorrheaDate] : 12/22/22 [TextBox_63] : NEG [ChlamydiaDate] : 12/22/22 [TextBox_68] : NEG [HPVDate] : 12/22/22 [TextBox_78] : NEG [HepatitisBDate] : 11/8/22 [LMPDate] : 1/6/23 [TextBox_83] : NEG [PGxTotal] : 1 [[facility] InstitutexLiving] : 0 [PGHxABSpont] : 1 [FreeTextEntry1] : 1/6/23

## 2023-05-20 NOTE — PHYSICAL EXAM
[Chaperone Present] : A chaperone was present in the examining room during all aspects of the physical examination [FreeTextEntry1] : ERASTO Up [Appropriately responsive] : appropriately responsive [Alert] : alert [No Acute Distress] : no acute distress [Oriented x3] : oriented x3

## 2023-05-20 NOTE — PLAN
[FreeTextEntry1] : -we discussed the expectations for her HCG to drop to LT 5. She is anxious to consider another pregnancy and we discussed allowing the endometrial lining to refresh for a few cycles. \par -all questions were answered.

## 2023-05-23 ENCOUNTER — NON-APPOINTMENT (OUTPATIENT)
Age: 33
End: 2023-05-23

## 2023-05-25 ENCOUNTER — NON-APPOINTMENT (OUTPATIENT)
Age: 33
End: 2023-05-25

## 2023-06-20 ENCOUNTER — APPOINTMENT (OUTPATIENT)
Dept: OBGYN | Facility: CLINIC | Age: 33
End: 2023-06-20
Payer: MEDICAID

## 2023-06-20 VITALS
SYSTOLIC BLOOD PRESSURE: 118 MMHG | HEIGHT: 62 IN | DIASTOLIC BLOOD PRESSURE: 54 MMHG | WEIGHT: 162 LBS | BODY MASS INDEX: 29.81 KG/M2

## 2023-06-20 DIAGNOSIS — Z34.92 ENCOUNTER FOR SUPERVISION OF NORMAL PREGNANCY, UNSPECIFIED, SECOND TRIMESTER: ICD-10-CM

## 2023-06-20 LAB
BILIRUB UR QL STRIP: NORMAL
CLARITY UR: CLEAR
COLLECTION METHOD: NORMAL
GLUCOSE UR-MCNC: NORMAL
HCG UR QL: 0.2 EU/DL
HGB UR QL STRIP.AUTO: NORMAL
KETONES UR-MCNC: NORMAL
LEUKOCYTE ESTERASE UR QL STRIP: ABNORMAL
NITRITE UR QL STRIP: NORMAL
PH UR STRIP: 6.5
PROT UR STRIP-MCNC: NORMAL
SP GR UR STRIP: 1.03

## 2023-06-20 PROCEDURE — 99213 OFFICE O/P EST LOW 20 MIN: CPT | Mod: TH

## 2023-06-24 LAB
AFP MOM: 1.05
AFP VALUE: 37.9 NG/ML
ALPHA FETOPROTEIN SERUM COMMENT: NORMAL
ALPHA FETOPROTEIN SERUM INTERPRETATION: NORMAL
ALPHA FETOPROTEIN SERUM RESULTS: NORMAL
ALPHA FETOPROTEIN SERUM TEST RESULTS: NORMAL
GESTATIONAL AGE BASED ON: NORMAL
GESTATIONAL AGE ON COLLECTION DATE: 16.7 WEEKS
INSULIN DEP DIABETES: NO
MATERNAL AGE AT EDD AFP: 33.3 YR
MULTIPLE GESTATION: NO
OSBR RISK 1 IN: NORMAL
RACE: NORMAL
WEIGHT AFP: 162 LBS

## 2023-06-26 ENCOUNTER — NON-APPOINTMENT (OUTPATIENT)
Age: 33
End: 2023-06-26

## 2023-06-27 ENCOUNTER — APPOINTMENT (OUTPATIENT)
Dept: OBGYN | Facility: CLINIC | Age: 33
End: 2023-06-27
Payer: MEDICAID

## 2023-06-27 DIAGNOSIS — R30.0 DYSURIA: ICD-10-CM

## 2023-06-27 LAB
CHROMOSOME13 INTERPRETATION: NORMAL
CHROMOSOME13 TEST RESULT: NORMAL
CHROMOSOME18 INTERPRETATION: NORMAL
CHROMOSOME18 TEST RESULT: NORMAL
CHROMOSOME21 INTERPRETATION: NORMAL
CHROMOSOME21 TEST RESULT: NORMAL
FETAL FRACTION: NORMAL
MICRODELETIONS INTERPRETATION: NORMAL
MICRODELETIONS RESULT: NORMAL
PERFORMANCE AND LIMITATIONS: NORMAL
SEX CHROMOSOME INTERPRETATION: NORMAL
SEX CHROMOSOME TEST RESULT: NORMAL
VERIFI WITH MICRODELETIONS: NOT DETECTED

## 2023-06-27 PROCEDURE — 99213 OFFICE O/P EST LOW 20 MIN: CPT | Mod: TH

## 2023-06-28 ENCOUNTER — NON-APPOINTMENT (OUTPATIENT)
Age: 33
End: 2023-06-28

## 2023-06-28 ENCOUNTER — TRANSCRIPTION ENCOUNTER (OUTPATIENT)
Age: 33
End: 2023-06-28

## 2023-06-30 LAB
BACTERIA UR CULT: NORMAL
BILIRUB UR QL STRIP: NORMAL
CLARITY UR: CLEAR
COLLECTION METHOD: NORMAL
GLUCOSE UR-MCNC: NORMAL
HCG UR QL: 0.2 EU/DL
HGB UR QL STRIP.AUTO: NORMAL
KETONES UR-MCNC: NORMAL
LEUKOCYTE ESTERASE UR QL STRIP: NORMAL
NITRITE UR QL STRIP: NORMAL
PH UR STRIP: 6
PROT UR STRIP-MCNC: NORMAL
SP GR UR STRIP: 1.03

## 2023-07-18 ENCOUNTER — APPOINTMENT (OUTPATIENT)
Dept: OBGYN | Facility: CLINIC | Age: 33
End: 2023-07-18
Payer: MEDICAID

## 2023-07-18 ENCOUNTER — ASOB RESULT (OUTPATIENT)
Age: 33
End: 2023-07-18

## 2023-07-18 ENCOUNTER — APPOINTMENT (OUTPATIENT)
Dept: ANTEPARTUM | Facility: CLINIC | Age: 33
End: 2023-07-18
Payer: MEDICAID

## 2023-07-18 VITALS — SYSTOLIC BLOOD PRESSURE: 99 MMHG | WEIGHT: 168 LBS | DIASTOLIC BLOOD PRESSURE: 64 MMHG | BODY MASS INDEX: 30.73 KG/M2

## 2023-07-18 PROCEDURE — 99213 OFFICE O/P EST LOW 20 MIN: CPT | Mod: TH

## 2023-07-18 PROCEDURE — 76811 OB US DETAILED SNGL FETUS: CPT

## 2023-07-18 PROCEDURE — 76817 TRANSVAGINAL US OBSTETRIC: CPT

## 2023-07-19 ENCOUNTER — NON-APPOINTMENT (OUTPATIENT)
Age: 33
End: 2023-07-19

## 2023-07-19 LAB
BILIRUB UR QL STRIP: NEGATIVE
GLUCOSE UR-MCNC: NORMAL
HCG UR QL: NORMAL EU/DL
HGB UR QL STRIP.AUTO: NEGATIVE
KETONES UR-MCNC: NEGATIVE
LEUKOCYTE ESTERASE UR QL STRIP: NORMAL
NITRITE UR QL STRIP: NEGATIVE
PH UR STRIP: 7
PROT UR STRIP-MCNC: NORMAL
SP GR UR STRIP: 1.02

## 2023-08-01 ENCOUNTER — ASOB RESULT (OUTPATIENT)
Age: 33
End: 2023-08-01

## 2023-08-01 ENCOUNTER — APPOINTMENT (OUTPATIENT)
Dept: ANTEPARTUM | Facility: CLINIC | Age: 33
End: 2023-08-01
Payer: MEDICAID

## 2023-08-01 PROCEDURE — 76816 OB US FOLLOW-UP PER FETUS: CPT

## 2023-08-16 ENCOUNTER — APPOINTMENT (OUTPATIENT)
Dept: OBGYN | Facility: CLINIC | Age: 33
End: 2023-08-16
Payer: MEDICAID

## 2023-08-16 VITALS
DIASTOLIC BLOOD PRESSURE: 71 MMHG | HEIGHT: 62 IN | SYSTOLIC BLOOD PRESSURE: 106 MMHG | BODY MASS INDEX: 32.2 KG/M2 | WEIGHT: 175 LBS

## 2023-08-16 PROCEDURE — 99213 OFFICE O/P EST LOW 20 MIN: CPT | Mod: TH

## 2023-08-28 ENCOUNTER — NON-APPOINTMENT (OUTPATIENT)
Age: 33
End: 2023-08-28

## 2023-09-06 ENCOUNTER — NON-APPOINTMENT (OUTPATIENT)
Age: 33
End: 2023-09-06

## 2023-09-07 ENCOUNTER — APPOINTMENT (OUTPATIENT)
Dept: OBGYN | Facility: CLINIC | Age: 33
End: 2023-09-07
Payer: COMMERCIAL

## 2023-09-07 VITALS
HEIGHT: 62 IN | DIASTOLIC BLOOD PRESSURE: 73 MMHG | SYSTOLIC BLOOD PRESSURE: 119 MMHG | WEIGHT: 178 LBS | BODY MASS INDEX: 32.76 KG/M2

## 2023-09-07 PROCEDURE — 0502F SUBSEQUENT PRENATAL CARE: CPT

## 2023-09-11 ENCOUNTER — NON-APPOINTMENT (OUTPATIENT)
Age: 33
End: 2023-09-11

## 2023-09-12 DIAGNOSIS — O99.810 ABNORMAL GLUCOSE COMPLICATING PREGNANCY: ICD-10-CM

## 2023-09-12 DIAGNOSIS — R73.09 OTHER ABNORMAL GLUCOSE: ICD-10-CM

## 2023-09-13 ENCOUNTER — NON-APPOINTMENT (OUTPATIENT)
Age: 33
End: 2023-09-13

## 2023-09-13 ENCOUNTER — APPOINTMENT (OUTPATIENT)
Dept: OBGYN | Facility: CLINIC | Age: 33
End: 2023-09-13
Payer: COMMERCIAL

## 2023-09-13 VITALS
BODY MASS INDEX: 32.84 KG/M2 | WEIGHT: 178.44 LBS | DIASTOLIC BLOOD PRESSURE: 76 MMHG | SYSTOLIC BLOOD PRESSURE: 108 MMHG | HEIGHT: 62 IN

## 2023-09-13 PROCEDURE — 99213 OFFICE O/P EST LOW 20 MIN: CPT | Mod: TH

## 2023-09-19 ENCOUNTER — APPOINTMENT (OUTPATIENT)
Dept: ANTEPARTUM | Facility: CLINIC | Age: 33
End: 2023-09-19

## 2023-09-27 ENCOUNTER — NON-APPOINTMENT (OUTPATIENT)
Age: 33
End: 2023-09-27

## 2023-09-27 ENCOUNTER — ASOB RESULT (OUTPATIENT)
Age: 33
End: 2023-09-27

## 2023-09-27 ENCOUNTER — APPOINTMENT (OUTPATIENT)
Dept: ANTEPARTUM | Facility: CLINIC | Age: 33
End: 2023-09-27
Payer: COMMERCIAL

## 2023-09-27 PROCEDURE — 76816 OB US FOLLOW-UP PER FETUS: CPT

## 2023-09-28 ENCOUNTER — APPOINTMENT (OUTPATIENT)
Dept: OBGYN | Facility: CLINIC | Age: 33
End: 2023-09-28

## 2023-10-02 ENCOUNTER — NON-APPOINTMENT (OUTPATIENT)
Age: 33
End: 2023-10-02

## 2023-10-03 ENCOUNTER — APPOINTMENT (OUTPATIENT)
Dept: OBGYN | Facility: CLINIC | Age: 33
End: 2023-10-03
Payer: COMMERCIAL

## 2023-10-03 VITALS
WEIGHT: 183 LBS | SYSTOLIC BLOOD PRESSURE: 108 MMHG | BODY MASS INDEX: 33.68 KG/M2 | DIASTOLIC BLOOD PRESSURE: 58 MMHG | HEIGHT: 62 IN

## 2023-10-03 DIAGNOSIS — Z23 ENCOUNTER FOR IMMUNIZATION: ICD-10-CM

## 2023-10-03 DIAGNOSIS — R79.89 OTHER SPECIFIED ABNORMAL FINDINGS OF BLOOD CHEMISTRY: ICD-10-CM

## 2023-10-03 LAB
BILIRUB UR QL STRIP: NORMAL
CLARITY UR: CLEAR
COLLECTION METHOD: NORMAL
GLUCOSE UR-MCNC: NORMAL
HCG UR QL: 0.2 EU/DL
HGB UR QL STRIP.AUTO: NORMAL
KETONES UR-MCNC: NORMAL
LEUKOCYTE ESTERASE UR QL STRIP: NORMAL
NITRITE UR QL STRIP: NORMAL
PH UR STRIP: 7.5
PROT UR STRIP-MCNC: NORMAL
SP GR UR STRIP: 1.01

## 2023-10-03 PROCEDURE — G0008: CPT

## 2023-10-03 PROCEDURE — 90682 RIV4 VACC RECOMBINANT DNA IM: CPT

## 2023-10-03 PROCEDURE — 0502F SUBSEQUENT PRENATAL CARE: CPT

## 2023-10-13 ENCOUNTER — NON-APPOINTMENT (OUTPATIENT)
Age: 33
End: 2023-10-13

## 2023-10-16 ENCOUNTER — NON-APPOINTMENT (OUTPATIENT)
Age: 33
End: 2023-10-16

## 2023-10-16 RX ORDER — RESPIRATORY SYNCYTIAL VIRUS VACCINE 120MCG/0.5
KIT INTRAMUSCULAR ONCE
Qty: 1 | Refills: 0 | Status: ACTIVE | COMMUNITY
Start: 2023-10-16 | End: 1900-01-01

## 2023-10-17 ENCOUNTER — NON-APPOINTMENT (OUTPATIENT)
Age: 33
End: 2023-10-17

## 2023-10-17 ENCOUNTER — APPOINTMENT (OUTPATIENT)
Dept: OBGYN | Facility: CLINIC | Age: 33
End: 2023-10-17
Payer: COMMERCIAL

## 2023-10-17 VITALS
WEIGHT: 183 LBS | SYSTOLIC BLOOD PRESSURE: 112 MMHG | DIASTOLIC BLOOD PRESSURE: 77 MMHG | HEIGHT: 62 IN | BODY MASS INDEX: 33.68 KG/M2

## 2023-10-17 LAB
BILIRUB UR QL STRIP: NORMAL
CLARITY UR: CLEAR
COLLECTION METHOD: NORMAL
GLUCOSE UR-MCNC: NORMAL
HCG UR QL: 0.2 EU/DL
HGB UR QL STRIP.AUTO: NORMAL
KETONES UR-MCNC: NORMAL
LEUKOCYTE ESTERASE UR QL STRIP: ABNORMAL
NITRITE UR QL STRIP: NORMAL
PH UR STRIP: 7
PROT UR STRIP-MCNC: NORMAL
SP GR UR STRIP: 1.02

## 2023-10-17 PROCEDURE — 0502F SUBSEQUENT PRENATAL CARE: CPT

## 2023-11-06 ENCOUNTER — ASOB RESULT (OUTPATIENT)
Age: 33
End: 2023-11-06

## 2023-11-06 ENCOUNTER — APPOINTMENT (OUTPATIENT)
Dept: ANTEPARTUM | Facility: CLINIC | Age: 33
End: 2023-11-06
Payer: COMMERCIAL

## 2023-11-06 ENCOUNTER — APPOINTMENT (OUTPATIENT)
Dept: OBGYN | Facility: CLINIC | Age: 33
End: 2023-11-06
Payer: COMMERCIAL

## 2023-11-06 VITALS
DIASTOLIC BLOOD PRESSURE: 71 MMHG | SYSTOLIC BLOOD PRESSURE: 121 MMHG | WEIGHT: 191 LBS | HEIGHT: 62 IN | BODY MASS INDEX: 35.15 KG/M2

## 2023-11-06 DIAGNOSIS — Z34.93 ENCOUNTER FOR SUPERVISION OF NORMAL PREGNANCY, UNSPECIFIED, THIRD TRIMESTER: ICD-10-CM

## 2023-11-06 LAB
BILIRUB UR QL STRIP: NORMAL
CLARITY UR: CLEAR
COLLECTION METHOD: NORMAL
GLUCOSE UR-MCNC: NORMAL
HCG UR QL: 0.2 EU/DL
HGB UR QL STRIP.AUTO: NORMAL
KETONES UR-MCNC: NORMAL
LEUKOCYTE ESTERASE UR QL STRIP: ABNORMAL
NITRITE UR QL STRIP: NORMAL
PH UR STRIP: 7
PROT UR STRIP-MCNC: NORMAL
SP GR UR STRIP: 1.01

## 2023-11-06 PROCEDURE — 76816 OB US FOLLOW-UP PER FETUS: CPT

## 2023-11-06 PROCEDURE — 0502F SUBSEQUENT PRENATAL CARE: CPT

## 2023-11-06 PROCEDURE — 87653 STREP B DNA AMP PROBE: CPT

## 2023-11-06 PROCEDURE — 81003 URINALYSIS AUTO W/O SCOPE: CPT | Mod: QW

## 2023-11-06 PROCEDURE — 36415 COLL VENOUS BLD VENIPUNCTURE: CPT

## 2023-11-07 ENCOUNTER — NON-APPOINTMENT (OUTPATIENT)
Age: 33
End: 2023-11-07

## 2023-11-07 LAB
BASOPHILS # BLD AUTO: 0.02 K/UL
BASOPHILS NFR BLD AUTO: 0.2 %
EOSINOPHIL # BLD AUTO: 0.06 K/UL
EOSINOPHIL NFR BLD AUTO: 0.7 %
FERRITIN SERPL-MCNC: 21 NG/ML
HCT VFR BLD CALC: 38.6 %
HGB BLD-MCNC: 12.9 G/DL
HIV1+2 AB SPEC QL IA.RAPID: NONREACTIVE
IMM GRANULOCYTES NFR BLD AUTO: 1.1 %
LYMPHOCYTES # BLD AUTO: 2.19 K/UL
LYMPHOCYTES NFR BLD AUTO: 27.3 %
MAN DIFF?: NORMAL
MCHC RBC-ENTMCNC: 29.9 PG
MCHC RBC-ENTMCNC: 33.4 GM/DL
MCV RBC AUTO: 89.4 FL
MONOCYTES # BLD AUTO: 0.47 K/UL
MONOCYTES NFR BLD AUTO: 5.9 %
NEUTROPHILS # BLD AUTO: 5.18 K/UL
NEUTROPHILS NFR BLD AUTO: 64.8 %
PLATELET # BLD AUTO: 241 K/UL
RBC # BLD: 4.32 M/UL
RBC # FLD: 13.5 %
WBC # FLD AUTO: 8.01 K/UL

## 2023-11-08 LAB
C TRACH RRNA SPEC QL NAA+PROBE: NOT DETECTED
N GONORRHOEA RRNA SPEC QL NAA+PROBE: NOT DETECTED
SOURCE AMPLIFICATION: NORMAL

## 2023-11-14 ENCOUNTER — NON-APPOINTMENT (OUTPATIENT)
Age: 33
End: 2023-11-14

## 2023-11-14 ENCOUNTER — APPOINTMENT (OUTPATIENT)
Dept: OBGYN | Facility: CLINIC | Age: 33
End: 2023-11-14
Payer: COMMERCIAL

## 2023-11-14 VITALS
SYSTOLIC BLOOD PRESSURE: 134 MMHG | HEIGHT: 62 IN | BODY MASS INDEX: 35.33 KG/M2 | DIASTOLIC BLOOD PRESSURE: 82 MMHG | WEIGHT: 192 LBS

## 2023-11-14 LAB
BILIRUB UR QL STRIP: NORMAL
GLUCOSE UR-MCNC: NORMAL
HCG UR QL: 0.2 EU/DL
HGB UR QL STRIP.AUTO: NORMAL
KETONES UR-MCNC: NORMAL
LEUKOCYTE ESTERASE UR QL STRIP: ABNORMAL
NITRITE UR QL STRIP: NORMAL
PH UR STRIP: 7
PROT UR STRIP-MCNC: NORMAL
SP GR UR STRIP: 1.02

## 2023-11-14 PROCEDURE — 0502F SUBSEQUENT PRENATAL CARE: CPT

## 2023-11-16 LAB — B-HEM STREP SPEC QL CULT: NORMAL

## 2023-11-20 ENCOUNTER — NON-APPOINTMENT (OUTPATIENT)
Age: 33
End: 2023-11-20

## 2023-11-21 ENCOUNTER — NON-APPOINTMENT (OUTPATIENT)
Age: 33
End: 2023-11-21

## 2023-11-22 ENCOUNTER — APPOINTMENT (OUTPATIENT)
Dept: OBGYN | Facility: CLINIC | Age: 33
End: 2023-11-22
Payer: COMMERCIAL

## 2023-11-22 VITALS
HEIGHT: 62 IN | WEIGHT: 194 LBS | SYSTOLIC BLOOD PRESSURE: 133 MMHG | DIASTOLIC BLOOD PRESSURE: 84 MMHG | BODY MASS INDEX: 35.7 KG/M2

## 2023-11-22 PROCEDURE — 0502F SUBSEQUENT PRENATAL CARE: CPT

## 2023-11-22 PROCEDURE — 59425 ANTEPARTUM CARE ONLY: CPT

## 2023-11-23 ENCOUNTER — RESULT REVIEW (OUTPATIENT)
Age: 33
End: 2023-11-23

## 2023-11-27 ENCOUNTER — NON-APPOINTMENT (OUTPATIENT)
Age: 33
End: 2023-11-27

## 2023-11-28 ENCOUNTER — APPOINTMENT (OUTPATIENT)
Dept: OBGYN | Facility: CLINIC | Age: 33
End: 2023-11-28

## 2023-11-28 ENCOUNTER — APPOINTMENT (OUTPATIENT)
Age: 33
End: 2023-11-28

## 2023-11-29 ENCOUNTER — APPOINTMENT (OUTPATIENT)
Age: 33
End: 2023-11-29
Payer: COMMERCIAL

## 2023-11-29 LAB
BILIRUB UR QL STRIP: NORMAL
GLUCOSE UR-MCNC: NORMAL
HCG UR QL: 0.2 EU/DL
HGB UR QL STRIP.AUTO: NORMAL
KETONES UR-MCNC: NORMAL
LEUKOCYTE ESTERASE UR QL STRIP: NORMAL
NITRITE UR QL STRIP: NORMAL
PH UR STRIP: 8.5
PROT UR STRIP-MCNC: NORMAL
SP GR UR STRIP: 1.01

## 2023-11-29 PROCEDURE — S9443: CPT | Mod: 95

## 2023-12-01 ENCOUNTER — TRANSCRIPTION ENCOUNTER (OUTPATIENT)
Age: 33
End: 2023-12-01

## 2023-12-05 ENCOUNTER — APPOINTMENT (OUTPATIENT)
Dept: OBGYN | Facility: CLINIC | Age: 33
End: 2023-12-05

## 2023-12-06 ENCOUNTER — APPOINTMENT (OUTPATIENT)
Dept: OBGYN | Facility: CLINIC | Age: 33
End: 2023-12-06
Payer: COMMERCIAL

## 2023-12-06 VITALS — DIASTOLIC BLOOD PRESSURE: 73 MMHG | SYSTOLIC BLOOD PRESSURE: 113 MMHG

## 2023-12-06 DIAGNOSIS — N76.0 ACUTE VAGINITIS: ICD-10-CM

## 2023-12-06 PROCEDURE — 0503F POSTPARTUM CARE VISIT: CPT

## 2023-12-06 RX ORDER — FLUCONAZOLE 150 MG/1
150 TABLET ORAL DAILY
Qty: 3 | Refills: 2 | Status: ACTIVE | COMMUNITY
Start: 2023-12-06 | End: 1900-01-01

## 2023-12-10 LAB
A VAGINAE DNA VAG QL NAA+PROBE: NORMAL
BVAB2 DNA VAG QL NAA+PROBE: NORMAL
C KRUSEI DNA VAG QL NAA+PROBE: NEGATIVE
CANDIDA DNA VAG QL NAA+PROBE: NEGATIVE
MEGA1 DNA VAG QL NAA+PROBE: NORMAL
T VAGINALIS RRNA SPEC QL NAA+PROBE: NEGATIVE

## 2023-12-13 ENCOUNTER — NON-APPOINTMENT (OUTPATIENT)
Age: 33
End: 2023-12-13

## 2023-12-18 ENCOUNTER — NON-APPOINTMENT (OUTPATIENT)
Age: 33
End: 2023-12-18

## 2023-12-20 ENCOUNTER — APPOINTMENT (OUTPATIENT)
Dept: OBGYN | Facility: CLINIC | Age: 33
End: 2023-12-20
Payer: COMMERCIAL

## 2023-12-20 ENCOUNTER — TRANSCRIPTION ENCOUNTER (OUTPATIENT)
Age: 33
End: 2023-12-20

## 2023-12-20 VITALS
DIASTOLIC BLOOD PRESSURE: 74 MMHG | HEIGHT: 62 IN | BODY MASS INDEX: 35.7 KG/M2 | WEIGHT: 194 LBS | SYSTOLIC BLOOD PRESSURE: 110 MMHG

## 2023-12-20 DIAGNOSIS — T81.33XA: ICD-10-CM

## 2023-12-20 DIAGNOSIS — N90.89 OTHER SPECIFIED NONINFLAMMATORY DISORDERS OF VULVA AND PERINEUM: ICD-10-CM

## 2023-12-20 PROCEDURE — 99212 OFFICE O/P EST SF 10 MIN: CPT

## 2023-12-20 NOTE — HISTORY OF PRESENT ILLNESS
[FreeTextEntry1] : s/p  23 patient noted a small opening at inferior vaginal introitus reports some discomfort    s/p 2nd laceration repair  denies fever/chills, nausea/emesis, dyspnea, or dizziness  Area evaluated and labial adhesion noted  Encouraged sitz baths and use of dermoplast spray for discomfort  re evaluate in 4 weeks for re evaluation.  consider excision and repair if required

## 2023-12-20 NOTE — PLAN
[FreeTextEntry1] : continue sitz baths  re evaluate 4 weeks and consider excision and repair if required

## 2023-12-20 NOTE — PHYSICAL EXAM
[Chaperone Present] : A chaperone was present in the examining room during all aspects of the physical examination [Appropriately responsive] : appropriately responsive [Alert] : alert [No Acute Distress] : no acute distress [Oriented x3] : oriented x3 [No Lesions] : no lesions  [Labia Majora] : normal [Normal] : normal [FreeTextEntry2] : labial adhesions in labia minora at inferior border

## 2024-01-01 LAB
BILIRUB UR QL STRIP: NORMAL
BILIRUB UR QL STRIP: NORMAL
GLUCOSE 1H P 50 G GLC PO SERPL-MCNC: 143 MG/DL
GLUCOSE UR-MCNC: NORMAL
GLUCOSE UR-MCNC: NORMAL
HCG UR QL: 0.2 EU/DL
HCG UR QL: 0.2 EU/DL
HCT VFR BLD CALC: 36.5 %
HGB BLD-MCNC: 11.2 G/DL
HGB UR QL STRIP.AUTO: NORMAL
HGB UR QL STRIP.AUTO: NORMAL
KETONES UR-MCNC: ABNORMAL
KETONES UR-MCNC: NORMAL
LEUKOCYTE ESTERASE UR QL STRIP: ABNORMAL
LEUKOCYTE ESTERASE UR QL STRIP: ABNORMAL
MCHC RBC-ENTMCNC: 29.6 PG
MCHC RBC-ENTMCNC: 30.7 GM/DL
MCV RBC AUTO: 96.3 FL
NITRITE UR QL STRIP: NORMAL
NITRITE UR QL STRIP: NORMAL
PH UR STRIP: 6
PH UR STRIP: 7
PLATELET # BLD AUTO: 275 K/UL
PROT UR STRIP-MCNC: 30
PROT UR STRIP-MCNC: NORMAL
RBC # BLD: 3.79 M/UL
RBC # FLD: 13.4 %
SP GR UR STRIP: 1
SP GR UR STRIP: 1.02
WBC # FLD AUTO: 9.15 K/UL

## 2024-01-02 ENCOUNTER — APPOINTMENT (OUTPATIENT)
Dept: OBGYN | Facility: CLINIC | Age: 34
End: 2024-01-02

## 2024-01-02 ENCOUNTER — APPOINTMENT (OUTPATIENT)
Dept: OBGYN | Facility: CLINIC | Age: 34
End: 2024-01-02
Payer: COMMERCIAL

## 2024-01-02 VITALS
SYSTOLIC BLOOD PRESSURE: 117 MMHG | HEIGHT: 62 IN | BODY MASS INDEX: 32.57 KG/M2 | DIASTOLIC BLOOD PRESSURE: 80 MMHG | WEIGHT: 177 LBS

## 2024-01-02 DIAGNOSIS — F41.9 ANXIETY DISORDER, UNSPECIFIED: ICD-10-CM

## 2024-01-02 LAB
HCG UR QL: NEGATIVE
QUALITY CONTROL: YES

## 2024-01-02 PROCEDURE — 0503F POSTPARTUM CARE VISIT: CPT

## 2024-01-02 RX ORDER — ESTRADIOL 0.1 MG/G
0.1 CREAM VAGINAL
Qty: 1 | Refills: 0 | Status: ACTIVE | COMMUNITY
Start: 2024-01-02 | End: 1900-01-01

## 2024-01-02 RX ORDER — NORETHINDRONE 0.35 MG/1
0.35 TABLET ORAL DAILY
Qty: 3 | Refills: 3 | Status: ACTIVE | COMMUNITY
Start: 2024-01-02 | End: 1900-01-01

## 2024-01-02 RX ORDER — GABAPENTIN 100 MG/1
100 CAPSULE ORAL 3 TIMES DAILY
Qty: 42 | Refills: 0 | Status: ACTIVE | COMMUNITY
Start: 2024-01-02 | End: 1900-01-01

## 2024-01-02 NOTE — DISCUSSION/SUMMARY
[FreeTextEntry1] : Postpartum visit, nl exam - sbe reviewed - anxiety, f/u with pyshc - gabapentin rx sent - estrace x 2 weeks - Pap/HPV sent - BC discussed - laverne sent - N/v 1 year for annual or prn

## 2024-01-02 NOTE — HISTORY OF PRESENT ILLNESS
[FreeTextEntry1] : Patient is presenting for postpartum visit. She is a 33 yr old  s/p  . discomfort by 2nd degree lac Denies vb, or discharge. No intercourse yet. No issues with BM or urination. She is breastfeeding reports anxiety back to pre pregnancy levels - has appointment with psychiatrist 2024. was previously on gabapentin  Last pap: 2022

## 2024-01-02 NOTE — PHYSICAL EXAM
[Appropriately responsive] : appropriately responsive [Alert] : alert [No Acute Distress] : no acute distress [No Lymphadenopathy] : no lymphadenopathy [Regular Rate Rhythm] : regular rate rhythm [Soft] : soft [Non-tender] : non-tender [Non-distended] : non-distended [No HSM] : No HSM [No Lesions] : no lesions [No Mass] : no mass [Oriented x3] : oriented x3 [Examination Of The Breasts] : a normal appearance [No Masses] : no breast masses were palpable [Labia Majora] : normal [Labia Minora] : normal [Normal] : normal [Uterine Adnexae] : normal [FreeTextEntry2] : small defect at introtus cut with scissors and pressure applied

## 2024-01-04 LAB
CYTOLOGY CVX/VAG DOC THIN PREP: NORMAL
HPV HIGH+LOW RISK DNA PNL CVX: NOT DETECTED

## 2024-01-18 ENCOUNTER — APPOINTMENT (OUTPATIENT)
Dept: OBGYN | Facility: CLINIC | Age: 34
End: 2024-01-18

## 2024-01-19 ENCOUNTER — NON-APPOINTMENT (OUTPATIENT)
Age: 34
End: 2024-01-19

## 2024-02-02 ENCOUNTER — OUTPATIENT (OUTPATIENT)
Dept: OUTPATIENT SERVICES | Facility: HOSPITAL | Age: 34
LOS: 1 days | Discharge: STILL ADMITTED OR ADMINISTRATIVE | End: 2024-02-02
Payer: COMMERCIAL

## 2024-02-02 PROCEDURE — 90833 PSYTX W PT W E/M 30 MIN: CPT

## 2024-02-02 PROCEDURE — 99215 OFFICE O/P EST HI 40 MIN: CPT

## 2024-02-22 DIAGNOSIS — F33.9 MAJOR DEPRESSIVE DISORDER, RECURRENT, UNSPECIFIED: ICD-10-CM

## 2024-03-04 ENCOUNTER — APPOINTMENT (OUTPATIENT)
Dept: OBGYN | Facility: CLINIC | Age: 34
End: 2024-03-04
Payer: COMMERCIAL

## 2024-03-04 VITALS
SYSTOLIC BLOOD PRESSURE: 125 MMHG | DIASTOLIC BLOOD PRESSURE: 84 MMHG | WEIGHT: 170 LBS | HEIGHT: 62 IN | BODY MASS INDEX: 31.28 KG/M2

## 2024-03-04 DIAGNOSIS — N39.3 STRESS INCONTINENCE (FEMALE) (MALE): ICD-10-CM

## 2024-03-04 DIAGNOSIS — N81.11 CYSTOCELE, MIDLINE: ICD-10-CM

## 2024-03-04 PROCEDURE — 99213 OFFICE O/P EST LOW 20 MIN: CPT

## 2024-03-04 NOTE — PHYSICAL EXAM
[Chaperone Present] : A chaperone was present in the examining room during all aspects of the physical examination [Alert] : alert [Appropriately responsive] : appropriately responsive [No Acute Distress] : no acute distress [Soft] : soft [Non-tender] : non-tender [Non-distended] : non-distended [Oriented x3] : oriented x3 [No Lesions] : no lesions  [Labia Minora] : normal [Labia Majora] : normal [Cystocele] : a cystocele [Pink Rugae] : pink rugae [No Bleeding] : There was no active vaginal bleeding [Normal] : normal [Tenderness] : nontender [Normal Position] : in a normal position [Mass ___ cm] : no uterine mass was palpated [Enlarged ___ wks] : not enlarged [Declined] : Patient declined rectal exam [Uterine Adnexae] : normal

## 2024-03-04 NOTE — HISTORY OF PRESENT ILLNESS
[FreeTextEntry1] : 32 yo s/p  23  Patient complains of feeling cervix at introitus and discomfort when sitting.  Reports menses are regular and normal.  Denies fever/chills, nausea/emesis, dyspnea, or dizziness. Denies vaginal discharge. Patient reports frequent loss of urine with exertion

## 2024-03-04 NOTE — PLAN
[FreeTextEntry1] : Kegel's Exercises Refer to physical therapy for pelvic floor exercises, etc exercises, diet, and weight loss   follow up 6-8 weeks for re evaluation and possible referral to Uro Gyn

## 2024-11-01 PROCEDURE — 99214 OFFICE O/P EST MOD 30 MIN: CPT | Mod: 95

## 2024-11-01 PROCEDURE — 90833 PSYTX W PT W E/M 30 MIN: CPT | Mod: 93
